# Patient Record
Sex: MALE | Race: WHITE
[De-identification: names, ages, dates, MRNs, and addresses within clinical notes are randomized per-mention and may not be internally consistent; named-entity substitution may affect disease eponyms.]

---

## 2022-05-31 ENCOUNTER — HOSPITAL ENCOUNTER (INPATIENT)
Dept: HOSPITAL 12 - ER | Age: 87
LOS: 2 days | Discharge: NURSING, REHAB OR LONG-TERM CARE | DRG: 74 | End: 2022-06-02
Payer: OTHER GOVERNMENT

## 2022-05-31 VITALS — HEIGHT: 71 IN | BODY MASS INDEX: 21.28 KG/M2 | WEIGHT: 152 LBS

## 2022-05-31 DIAGNOSIS — I16.0: ICD-10-CM

## 2022-05-31 DIAGNOSIS — E86.0: ICD-10-CM

## 2022-05-31 DIAGNOSIS — R32: ICD-10-CM

## 2022-05-31 DIAGNOSIS — I10: ICD-10-CM

## 2022-05-31 DIAGNOSIS — Z20.822: ICD-10-CM

## 2022-05-31 DIAGNOSIS — R15.9: ICD-10-CM

## 2022-05-31 DIAGNOSIS — G90.8: Primary | ICD-10-CM

## 2022-05-31 DIAGNOSIS — G89.29: ICD-10-CM

## 2022-05-31 DIAGNOSIS — Z85.528: ICD-10-CM

## 2022-05-31 DIAGNOSIS — H54.8: ICD-10-CM

## 2022-05-31 LAB
ALP SERPL-CCNC: 102 U/L (ref 50–136)
ALT SERPL W/O P-5'-P-CCNC: 23 U/L (ref 16–63)
AST SERPL-CCNC: 21 U/L (ref 15–37)
BILIRUB DIRECT SERPL-MCNC: 0.2 MG/DL (ref 0–0.2)
BILIRUB SERPL-MCNC: 0.7 MG/DL (ref 0.2–1)
BUN SERPL-MCNC: 14 MG/DL (ref 7–18)
CHLORIDE SERPL-SCNC: 105 MMOL/L (ref 98–107)
CO2 SERPL-SCNC: 28 MMOL/L (ref 21–32)
CREAT SERPL-MCNC: 0.9 MG/DL (ref 0.6–1.3)
GLUCOSE SERPL-MCNC: 102 MG/DL (ref 74–106)
HCT VFR BLD AUTO: 43.6 % (ref 36.7–47.1)
MCH RBC QN AUTO: 29.5 UUG (ref 23.8–33.4)
MCV RBC AUTO: 87 FL (ref 73–96.2)
PLATELET # BLD AUTO: 147 K/UL (ref 152–348)
POTASSIUM SERPL-SCNC: 4.4 MMOL/L (ref 3.5–5.1)
WS STN SPEC: 6.6 G/DL (ref 6.4–8.2)

## 2022-05-31 PROCEDURE — A4663 DIALYSIS BLOOD PRESSURE CUFF: HCPCS

## 2022-05-31 PROCEDURE — A9150 MISC/EXPER NON-PRESCRIPT DRU: HCPCS

## 2022-05-31 PROCEDURE — G0378 HOSPITAL OBSERVATION PER HR: HCPCS

## 2022-06-01 VITALS — DIASTOLIC BLOOD PRESSURE: 90 MMHG | SYSTOLIC BLOOD PRESSURE: 172 MMHG

## 2022-06-01 VITALS — DIASTOLIC BLOOD PRESSURE: 88 MMHG | SYSTOLIC BLOOD PRESSURE: 169 MMHG

## 2022-06-01 VITALS — DIASTOLIC BLOOD PRESSURE: 92 MMHG | SYSTOLIC BLOOD PRESSURE: 150 MMHG

## 2022-06-01 VITALS — DIASTOLIC BLOOD PRESSURE: 77 MMHG | SYSTOLIC BLOOD PRESSURE: 149 MMHG

## 2022-06-01 VITALS — DIASTOLIC BLOOD PRESSURE: 79 MMHG | SYSTOLIC BLOOD PRESSURE: 135 MMHG

## 2022-06-01 VITALS — SYSTOLIC BLOOD PRESSURE: 152 MMHG | DIASTOLIC BLOOD PRESSURE: 82 MMHG

## 2022-06-01 RX ADMIN — LISINOPRIL SCH MG: 20 TABLET ORAL at 08:03

## 2022-06-01 RX ADMIN — SODIUM CHLORIDE PRN MLS/HR: 0.9 INJECTION, SOLUTION INTRAVENOUS at 22:22

## 2022-06-01 RX ADMIN — THERA TABS SCH UDTAB: TAB at 08:22

## 2022-06-01 RX ADMIN — SODIUM CHLORIDE PRN MLS/HR: 0.9 INJECTION, SOLUTION INTRAVENOUS at 08:02

## 2022-06-01 RX ADMIN — ASPIRIN SCH MG: 81 TABLET, CHEWABLE ORAL at 08:03

## 2022-06-01 RX ADMIN — HEPARIN SODIUM SCH UNITS: 5000 INJECTION, SOLUTION INTRAVENOUS; SUBCUTANEOUS at 08:04

## 2022-06-01 RX ADMIN — HEPARIN SODIUM SCH UNITS: 5000 INJECTION, SOLUTION INTRAVENOUS; SUBCUTANEOUS at 20:31

## 2022-06-01 RX ADMIN — DULOXETINE HYDROCHLORIDE SCH MG: 30 CAPSULE, DELAYED RELEASE ORAL at 08:22

## 2022-06-01 RX ADMIN — DOCUSATE SODIUM SCH MG: 100 CAPSULE, LIQUID FILLED ORAL at 08:03

## 2022-06-01 NOTE — NUR
I attempted to give nursing SBAR. Nurse Alxeis will call ER as soon as 
possible. Patient will go to room 322.

## 2022-06-01 NOTE — NUR
UA CS specimen sent to lab. Call light with in reach. Bed alarm on. IVF infusing well. Not 
in distress

## 2022-06-01 NOTE — NUR
[facility] 86 [facility] the chief [facility] of fall and 
hurting his back, with the diagnosis of recurrent syncope. Transferred to bed comfortably. 
Routine admission care rendered. Placed on Tele SR. Awake, alert oriented x 4, legally 
blind. IVF restarted NS at 70ml/hr, infusing well. Room air 98% O2 sat. Call light with in 
reach. Bed alarm on.

## 2022-06-01 NOTE — NUR
Writer assumes care: 1st contact with patient, asleep, easily arousable, 
oriented x4 when awake, moving all extremities, respiration :easy, unlabored 
and even, calm & cooperative, skin warm and dry,+mild pains@back of the head, 
no visible injuries seen. Patient is waiting for an accepting telemetry nurse & 
bed at this time.

## 2022-06-02 VITALS — DIASTOLIC BLOOD PRESSURE: 83 MMHG | SYSTOLIC BLOOD PRESSURE: 143 MMHG

## 2022-06-02 VITALS — SYSTOLIC BLOOD PRESSURE: 125 MMHG | DIASTOLIC BLOOD PRESSURE: 73 MMHG

## 2022-06-02 VITALS — DIASTOLIC BLOOD PRESSURE: 87 MMHG | SYSTOLIC BLOOD PRESSURE: 152 MMHG

## 2022-06-02 VITALS — DIASTOLIC BLOOD PRESSURE: 76 MMHG | SYSTOLIC BLOOD PRESSURE: 133 MMHG

## 2022-06-02 VITALS — SYSTOLIC BLOOD PRESSURE: 125 MMHG | DIASTOLIC BLOOD PRESSURE: 66 MMHG

## 2022-06-02 LAB
ALP SERPL-CCNC: 95 U/L (ref 50–136)
ALT SERPL W/O P-5'-P-CCNC: 16 U/L (ref 16–63)
APPEARANCE UR: CLEAR
AST SERPL-CCNC: 14 U/L (ref 15–37)
BILIRUB SERPL-MCNC: 1.1 MG/DL (ref 0.2–1)
BILIRUB UR QL STRIP: NEGATIVE
BUN SERPL-MCNC: 14 MG/DL (ref 7–18)
CHLORIDE SERPL-SCNC: 107 MMOL/L (ref 98–107)
CO2 SERPL-SCNC: 27 MMOL/L (ref 21–32)
COLOR UR: YELLOW
CREAT SERPL-MCNC: 0.9 MG/DL (ref 0.6–1.3)
DEPRECATED SQUAMOUS URNS QL MICRO: (no result) /HPF
GLUCOSE SERPL-MCNC: 91 MG/DL (ref 74–106)
GLUCOSE UR STRIP-MCNC: NEGATIVE MG/DL
HCT VFR BLD AUTO: 42.4 % (ref 36.7–47.1)
HGB UR QL STRIP: NEGATIVE
KETONES UR STRIP-MCNC: NEGATIVE MG/DL
LEUKOCYTE ESTERASE UR QL STRIP: NEGATIVE
MAGNESIUM SERPL-MCNC: 1.9 MG/DL (ref 1.8–2.4)
MCH RBC QN AUTO: 29.6 UUG (ref 23.8–33.4)
MCV RBC AUTO: 87.8 FL (ref 73–96.2)
NITRITE UR QL STRIP: NEGATIVE
PH UR STRIP: 6.5 [PH] (ref 5–8)
PHOSPHATE SERPL-MCNC: 2.9 MG/DL (ref 2.5–4.9)
PLATELET # BLD AUTO: 147 K/UL (ref 152–348)
POTASSIUM SERPL-SCNC: 3.4 MMOL/L (ref 3.5–5.1)
RBC #/AREA URNS HPF: (no result) /HPF (ref 0–3)
SP GR UR STRIP: 1.02 (ref 1–1.03)
UROBILINOGEN UR STRIP-MCNC: 1 E.U./DL
WBC #/AREA URNS HPF: (no result) /HPF
WBC #/AREA URNS HPF: (no result) /HPF (ref 0–3)
WS STN SPEC: 5.8 G/DL (ref 6.4–8.2)

## 2022-06-02 RX ADMIN — THERA TABS SCH UDTAB: TAB at 09:50

## 2022-06-02 RX ADMIN — HEPARIN SODIUM SCH UNITS: 5000 INJECTION, SOLUTION INTRAVENOUS; SUBCUTANEOUS at 09:52

## 2022-06-02 RX ADMIN — ASPIRIN SCH MG: 81 TABLET, CHEWABLE ORAL at 09:50

## 2022-06-02 RX ADMIN — DULOXETINE HYDROCHLORIDE SCH MG: 30 CAPSULE, DELAYED RELEASE ORAL at 09:50

## 2022-06-02 RX ADMIN — LISINOPRIL SCH MG: 20 TABLET ORAL at 09:50

## 2022-06-02 RX ADMIN — DOCUSATE SODIUM SCH MG: 100 CAPSULE, LIQUID FILLED ORAL at 09:49

## 2022-06-02 NOTE — NUR
Report given to Annemarie VASQUEZ of [facility].  Patient scheduled for  between [id number]

## 2022-06-02 NOTE — NUR
Slept throughout the night. Able to verbalize needs. IV site intact. No distress noted. Able 
to ambulate with assistance, bed alarm on. Safety maintained. Will endorse to day shift.